# Patient Record
Sex: FEMALE | Race: WHITE
[De-identification: names, ages, dates, MRNs, and addresses within clinical notes are randomized per-mention and may not be internally consistent; named-entity substitution may affect disease eponyms.]

---

## 2017-01-25 NOTE — ER DOCUMENT REPORT
ED GI/





- General


Mode of Arrival: Ambulatory


Information source: Patient


TRAVEL OUTSIDE OF THE [place] IN LAST 30 DAYS: No





- HPI


Patient complains to provider of: Abdominal pain, Diarrhea, Vomiting


Onset: Other - 2 days ago


Location: Other - see above


Associated symptoms: Other - see above





<IONA VAUGHN - Last Filed: 01/25/17 20:32>





<LORRIEDUARDO ANN - Last Filed: 01/26/17 03:20>





- General


Chief Complaint: Abdominal Pain


Stated Complaint: VOMITING/DIARRHEA


Notes: 


32 year old female with history of ADHD, depression, and anxiety presents to 

the ED complaining of right sided abdominal pain that started 2 days ago. 

Patient is additionally complaining of vomiting and diarrhea. Patient states 

that yesterday morning she woke up with a "sour stomach" and took a pepto 

bismol tablet when she arrived to work. Patient explains that she then 

proceeded to vomit and her symptoms have been worsening since then. Patient has 

a past abdominal surgical history of an appendectomy in May 2013. (IONA VAUGHN)





- Related Data


Allergies/Adverse Reactions: 


 





codeine [Codeine] Allergy (Verified 01/25/17 15:38)


 











Past Medical History





- General


Information source: Patient





- Social History


Smoking Status: Current Every Day Smoker


Chew tobacco use (# tins/day): No


Drug Abuse: None


Family History: Reviewed & Not Pertinent


Patient has suicidal ideation: No


Patient has homicidal ideation: No


Renal/ Medical History: Reports: Hx Ectopic Pregnancy - treated with 

methotrexate


Psychiatric Medical History: Reports: Hx Anxiety, Hx Bipolar Disorder, Hx 

Depression


Past Surgical History: Reports: Hx Appendectomy - May 2013





- Immunizations


Hx Diphtheria, Pertussis, Tetanus Vaccination: Yes





<IONA VAUGHN - Last Filed: 01/25/17 20:32>





Review of Systems





- Review of Systems


Constitutional: No symptoms reported


EENT: No symptoms reported


Cardiovascular: No symptoms reported


Respiratory: No symptoms reported


Gastrointestinal: See HPI, Abdominal pain - right side, Diarrhea, Vomiting


Genitourinary: No symptoms reported


Female Genitourinary: No symptoms reported


Musculoskeletal: No symptoms reported


Skin: No symptoms reported


Hematologic/Lymphatic: No symptoms reported


Neurological/Psychological: No symptoms reported


-: Yes All other systems reviewed and negative





<IONA VAUGHN - Last Filed: 01/25/17 20:32>





Physical Exam





- Vital signs


Interpretation: Normal





- General


General appearance: Alert


In distress: None





- HEENT


Head: Normocephalic, Atraumatic


Eyes: Normal


Extraocular movements intact: Yes


Pupils: PERRL


Mucous membranes: Dry





- Respiratory


Respiratory status: No respiratory distress


Breath sounds: Normal





- Cardiovascular


Rhythm: Regular


Heart sounds: Normal auscultation





- Abdominal


Inspection: Normal


Tenderness: Tender - RUQ tenderness to palpation





- Back


Back: Normal





- Extremities


General upper extremity: Normal inspection, Normal ROM


General lower extremity: Normal inspection, Normal ROM





- Neurological


Neuro grossly intact: Yes


Cognition: Normal


Orientation: AAOx4


[place] Coma Scale Eye Opening: Spontaneous


[place] Coma Scale Verbal: Oriented


[place] Coma Scale Motor: Obeys Commands


[place] Coma Scale Total: 15


Speech: Normal





- Psychological


Associated symptoms: Normal affect, Normal mood





- Skin


Skin Temperature: Warm


Skin Moisture: Dry


Skin Color: Normal





<IONA VAUGHN - Last Filed: 01/25/17 20:32>





<EDUARDO BLACKMON - Last Filed: 01/26/17 03:20>





- Vital signs


Vitals: 


 











Temp Pulse Resp BP Pulse Ox


 


 98.4 F   79   16   120/82   96 


 


 01/25/17 15:29  01/25/17 15:29  01/25/17 15:29  01/25/17 15:29  01/25/17 15:29








 (IONA VAUGHN)


 (EDUARDO BLACKMON)





Course





- Laboratory


Result Diagrams: 


 01/25/17 15:45





 01/25/17 15:45





<IONA VAUGHN - Last Filed: 01/25/17 20:32>





- Laboratory


Result Diagrams: 


 01/25/17 15:45





 01/25/17 15:45





- Diagnostic Test


Radiology reviewed: Reports reviewed





<EDUARDO BLACKMON - Last Filed: 01/26/17 03:20>





- Re-evaluation


Re-evalutation: 


01/25/17 21:04


Patient with continued right-sided pain.  Now 18 is lower in her abdomen.  

Patient will be given pain medication and CT ordered.





01/26/17 


Patient feels better after pain medication and fluids.  No acute findings and 

CT.  Blood work within normal limits.  Patient is able to take by mouth.  

Stable for discharge home.  Return if any worsening or concerning symptoms. (

EDUARDO BLACKMON)





- Vital Signs


Vital signs: 


 











Temp Pulse Resp BP Pulse Ox


 


 98.4 F   79   16   120/82   96 


 


 01/25/17 15:29  01/25/17 15:29  01/25/17 15:29  01/25/17 15:29  01/25/17 15:29








 (IONA VAUGHN)


 (EDUARDO BLACKMON)





- Laboratory


Laboratory results interpreted by me: 


 











  01/25/17 01/25/17





  15:45 15:50


 


WBC  14.1 H 


 


Absolute Neutrophils  10.4 H 


 


Urine Protein   100 H


 


Ur Leukocyte Esterase   MODERATE H








 (IONA VAUGHN)


 (EDUARDO BLACKMON)





Discharge





<IONA VAUGHN - Last Filed: 01/25/17 20:32>





<EDUARDO BLACKMON - Last Filed: 01/26/17 03:20>





- Discharge


Clinical Impression: 


Abdominal pain


Qualifiers:


 Abdominal location: right upper quadrant Qualified Code(s): R10.11 - Right 

upper quadrant pain





Vomiting


Qualifiers:


 Vomiting type: unspecified Vomiting Intractability: non-intractable Nausea 

presence: with nausea Qualified Code(s): R11.2 - Nausea with vomiting, 

unspecified





Diarrhea


Qualifiers:


 Diarrhea type: unspecified type Qualified Code(s): R19.7 - Diarrhea, 

unspecified





Condition: Stable


Disposition: HOME, SELF-CARE


Instructions:  Abdominal Pain ([facility]), Vomiting ([facility]), Diarrhea, Nonspecific ([facility])


Prescriptions: 


Ondansetron [Zofran Odt 4 mg Tablet] 1 - 2 tab PO Q4H PRN #15 tab.rapdis


 PRN Reason: For Nausea/Vomiting


Oxycodone HCl/Acetaminophen [Percocet 5-325 mg Tablet] 1 - 2 tab PO Q4H PRN #15 

tablet


 PRN Reason: 


Forms:  Return to Work


Referrals: 


BRUNO LOVE MD [Primary Care Provider] - Follow up as needed


Scribe Attestation: 





01/26/17 03:19


I personally performed the services described in the documentation, reviewed 

and edited the documentation which was dictated to the scribe in my presence, 

and it accurately records my words and actions. (EDUARDO BLACKMON)





Scribe Documentation





- Scribe


Written by Scribe:: Ralph Ha, 01/25/2017 19:14


acting as scribe for :: Lorri





<IONA VAUGHN - Last Filed: 01/25/17 20:32>

## 2017-01-25 NOTE — ER DOCUMENT REPORT
ED Medical Screen (RME)





- General


Stated Complaint: VOMITING/DIARRHEA


Notes: 


Patient is a 32-year-old female presents emergency Department with vomiting and 

diarrhea for the past 2 days.  She does admit to hematemesis that started 

today.  Admits to on and off hot flashes at home.  Admits to abdominal pain 

over her umbilicus that radiates up to her chest.  Appendectomy May 2013.





I have greeted and performed a rapid initial assessment of this patient. A 

comprehensive ED assessment and evaluation of the patient, analysis of test 

results and completion of the medical decision making process will be conducted 

by additional ED providers.








TRAVEL OUTSIDE OF THE [place] IN LAST 30 DAYS: No





- Related Data


Allergies/Adverse Reactions: 


 





codeine [Codeine] Allergy (Verified 11/23/16 13:02)


 











Past Medical History


Pulmonary Medical History: 


   Denies: Hx Asthma, Hx Bronchitis, Hx Pneumonia


Renal/ Medical History: Reports: Hx Ectopic Pregnancy - treated with 

methotrexate


Psychiatric Medical History: Reports: Hx Anxiety, Hx Bipolar Disorder, Hx 

Depression


Past Surgical History: Reports: Hx Appendectomy





- Immunizations


Hx Diphtheria, Pertussis, Tetanus Vaccination: Yes





Physical Exam





- Vital signs


Vitals: 





 











Temp Pulse Resp BP Pulse Ox


 


 98.4 F   79   16   120/82   96 


 


 01/25/17 15:29  01/25/17 15:29  01/25/17 15:29  01/25/17 15:29  01/25/17 15:29














Course





- Vital Signs


Vital signs: 





 











Temp Pulse Resp BP Pulse Ox


 


 98.4 F   79   16   120/82   96 


 


 01/25/17 15:29  01/25/17 15:29  01/25/17 15:29  01/25/17 15:29  01/25/17 15:29

## 2017-03-06 NOTE — ER DOCUMENT REPORT
HPI





- HPI


Patient complains to provider of: upper respiratory symptoms


Onset: Other - 4 days


Onset/Duration: Gradual


Quality of pain: Achy


Pain Level: 4


Context: 


Patient complains of sore throat, cough and congestion for the past 4 days.  

Patient reports productive cough.  Patient states she has had some nausea.  

Patient denies any vomiting or diarrhea.  Patient reports fever of 102 

yesterday.  Reports multiple sick contacts at work with similar symptoms.


Associated Symptoms: Body/muscle aches, Chills, Productive cough, Fever, Sore 

throat.  denies: Headache


Exacerbated by: Denies


Relieved by: Denies


Similar symptoms previously: Yes


Recently seen / treated by doctor: No





- ROS


ROS below otherwise negative: Yes


Systems Reviewed and Negative: Yes All other systems reviewed and negative





- CONSTITUTIONAL


Constitutional: REPORTS: Fever, Chills





- EENT


EENT: REPORTS: Sore Throat, Congestion





- RESPIRATORY


Respiratory: REPORTS: Coughing.  DENIES: Trouble Breathing





- GASTROINTESTINAL


Gastrointestinal: REPORTS: Nausea.  DENIES: Patient vomiting, Diarrhea, Black / 

Bloody Stools





- REPRODUCTIVE


Reproductive: DENIES: Pregnant:





- MUSCULOSKELETAL


Musculoskeletal: DENIES: Extremity pain





- DERM


Skin Color: Normal


Skin Problems: None





Past Medical History





- General


Information source: Patient


Last Menstrual Period: 02/16/2017





- Social History


Smoking Status: Current Every Day Smoker - One pack per day


Frequency of alcohol use: None


Drug Abuse: None


Occupation: [employer]


Lives with: Family


Family History: Reviewed & Not Pertinent


Pulmonary Medical History: 


   Denies: Hx Asthma, Hx Bronchitis, Hx Pneumonia


Renal/ Medical History: Reports: Hx Ectopic Pregnancy - treated with 

methotrexate.  Denies: Hx Peritoneal Dialysis


Musculoskeltal Medical History: Reports Hx Arthritis - Sciatica


Psychiatric Medical History: Reports: Hx Anxiety, Hx Bipolar Disorder, Hx 

Depression


Past Surgical History: Reports: Hx Appendectomy - May 2013





- Immunizations


Hx Diphtheria, Pertussis, Tetanus Vaccination: Yes





Vertical Provider Document





- CONSTITUTIONAL


Agree With Documented VS: Yes


Exam Limitations: No Limitations


General Appearance: WD/WN, No Apparent Distress





- INFECTION CONTROL


TRAVEL OUTSIDE OF THE [place] IN LAST 30 DAYS: No





- HEENT


HEENT: Atraumatic, Normocephalic, Pharyngeal Tenderness.  negative: Pharyngeal 

Exudate, Tympanic Membrane Red, Tympanic Membrane Bulging





- NECK


Neck: Normal Inspection, Supple.  negative: Lymphadenopathy-Left, 

Lymphadenopathy-Right





- RESPIRATORY


Respiratory: No Respiratory Distress, Rhonchi, Wheezing


O2 Sat by Pulse Oximetry: 98





- CARDIOVASCULAR


Cardiovascular: Regular Rhythm, No Murmur, Tachycardia





- BACK


Back: Normal Inspection





- MUSCULOSKELETAL/EXTREMETIES


Musculoskeletal/Extremeties: MAEW, FROM





- NEURO


Level of Consciousness: Awake, Alert, Appropriate


Motor/Sensory: No Motor Deficit





- DERM


Integumentary: Warm, Dry, No Rash





Course





- Re-evaluation


Re-evalutation: 


03/06/17 17:52


Patient with decreased wheezing and increased air movement bilaterally.





03/06/17 19:20


Heart rate continues mildly tachycardic 114.  Patient states that she feels 

much better after the breathing treatment.  Patient does take Adderall and 

states that she has had an elevated heart rate in the past but on subsequent 

visits her heart rate has normalized.  Patient denies any history of DVT or PE, 

recent travel, bedrest or immobilization.  Patient does not take any birth 

control. 





Consulted with Dr. Rivero regarding patient presentation.  Agrees with 

discharge plan of care, does not recommend any additional testing at this time.

  





- Vital Signs


Vital signs: 


 











Temp Pulse Resp BP Pulse Ox


 


 98.1 F   111 H  18   141/82 H  98 


 


 03/06/17 14:27  03/06/17 14:27  03/06/17 14:27  03/06/17 14:27  03/06/17 14:27














- Diagnostic Test


Radiology reviewed: Image reviewed, Reports reviewed





Discharge





- Discharge


Clinical Impression: 


 Bronchospasm





Upper respiratory infection


Qualifiers:


 URI type: unspecified URI Qualified Code(s): J06.9 - Acute upper respiratory 

infection, unspecified





Condition: Stable


Disposition: HOME, SELF-CARE


Additional Instructions: 


Return immediately for any new or worsening symptoms





Followup with your primary care provider, call tomorrow to make a followup 

appointment





UPPER RESPIRATORY ILLNESS:





     You have a viral infection of the respiratory passages -- a "cold."  This 

common infection causes nasal congestion, drainage, and often sore throat and 

cough.  It is highly contagious.  The disease usually lasts about 10 to 14 days.


     There is no "cure" for the viral infection -- it must run its course.  If 

there is a complication, such as bacterial infection in the nose, sinuses, 

middle ear, or bronchial tubes, antibiotics may be required.  The antibiotics 

won't affect the virus.


     Drink plenty of fluids.  A humidifier may help.  An expectorant medication 

or decongestant may make you more comfortable.  Use acetaminophen or ibuprofen 

for fever or aches.


     See the doctor if fever persists over two days, if there is any 

significant worsening of your symptoms, or if you simply fail to improve as 

expected.








BRONCHOSPASM:





     You have tightness in the bronchial tubes, called bronchospasm. This often 

occurs with bronchial infections.  Allergies, inhaled chemicals, and polluted 

or cold air can also provoke bronchospasm. It's more likely in patients with 

asthma in the family.


     Emergency treatment of bronchospasm may include adrenaline shots or 

bronchodilator aerosol.  You may feel lightheaded and have a rapid pulse for an 

hour or two.  Rest and get plenty of fluids.


     At home, we'll treat you with a bronchodilator inhaler. Antibiotics and 

corticosteroids may be required for some patients. Until you recover, avoid 

chemical fumes, dusts, pollens, and exercising in very cold or dry air. If you 

smoke, stop now!!


     If you develop a fever, increased wheezing, chest pain, or severe 

shortness of breath, you should contact the doctor immediately.





INHALED BRONCHODILATORS:


     You have received a treatment of and/or prescription for an inhaled 

bronchodilator -- a medication which stimulates the airways in the lung to 

dilate.  This improves the flow of air in asthma, bronchitis, and emphysema.


     These medicines have some similarity to adrenaline, and can cause similar 

side effects:  shakiness, racing heart, and a sense of nervousness.  These side 

effects decrease with time.  Contact your doctor if these side effects are 

severe.


     Do not over-use the medicine.  Too-frequent use of the inhaler may make it 

ineffective.  Call your doctor if the inhaler is not controlling your symptoms 

at the prescribed doses.








STEROID MEDICATION:


     You have been given an injection of or oral medicine of the cortisone/

steroid class.  This medication is used to control inflammation or allergy.  

Barak t is usually only given for a short period of time, until the acute process 

subsides.


     There are usually no side effects from short-term use of cortisone-like 

medications.  Some persons feel an increased sense of well-being and are not 

sleepy at bedtime.  Long-term use of cortisone medications is best avoided, 

unless required for a severe condition.  If your condition does not remit, or 

relapses after the course of corticosteroid medication, you should consult your 

physician.








USE OF ACETAMINOPHEN (Tylenol):


     Acetaminophen may be taken for pain relief or fever control. It's much 

safer than aspirin, offering a wider range of "safe" dosages.  It is safe 

during pregnancy.  Some brand names are Tylenol, Panadol, Datril, Anacin 3, 

Tempra, and Liquiprin. Acetaminophen can be repeated every four hours.  The 

following are maximum recommended dosages:


>89 pounds or adults          650 mg to 900 mg


Acetaminophen can be repeated every four hours.  Maximum dose not to exceed 

4000 mg a day.








SMOKING:


     If you smoke, you should stop smoking.  The tar and chemicals in cigarette 

smoke are harmful.  Smoking has been shown to cause:


          emphysema


          chronic bronchitis


          lung cancer


          mouth and throat cancer


          stomach and pancreas cancer


          premature aging


          birth defects


     In addition, smoking increases ear and lung infections in children of 

smokers.








FOLLOW-UP CARE:


If you have been referred to a physician for follow-up care, call the physician

s office for an appointment as you were instructed or within the next two days.

  If you experience worsening or a significant change in your symptoms, notify 

the physician immediately or return to the Emergency Department at any time for 

re-evaluation.





Prescriptions: 


Albuterol Sulfate [Ventolin Hfa] 2 puff IH Q4HP PRN #17 gm


 PRN Reason: 


Oxycodone HCl/Acetaminophen [Percocet 5-325 mg Tablet] 1 tab PO ASDIR PRN #12 

tablet


 PRN Reason: 


Prednisone [Deltasone 20 mg Tablet] 3 tab PO DAILY 4 Days


Forms:  Smoking Cessation Education, Return to Work


Referrals: 


BRUNO LOVE MD [Primary Care Provider] - Follow up tomorrow

## 2017-04-13 NOTE — EKG REPORT
SEVERITY:- ABNORMAL ECG -

SINUS RHYTHM

INCOMPLETE RIGHT BUNDLE BRANCH BLOCK

:

Confirmed by: Seth Moise MD 13-Apr-2017 19:49:46

## 2017-04-13 NOTE — ER DOCUMENT REPORT
ED General





- General


Chief Complaint: Psych Problem


Stated Complaint: IVC W/PAPERS


TRAVEL OUTSIDE OF THE [place] IN LAST 30 DAYS: No





- HPI


Patient complains to provider of: homicidal suicidal ideation


Notes: 


Patient coming in on IVC paper work for her suicidal ideation.  Patient admits 

to homicidal suicidal ideation.  Patient states that she would had a plan to 

kill her  and kill herself as that she thought her  could not 

live without her.  Patient does states she's had multiple psychiatric 

admissions in the past.  Patient does state she has been compliant with her 

medications.  Upon my evaluation patient is sitting on the bed comfortably in 

no obvious distress





- Related Data


Allergies/Adverse Reactions: 


 





codeine [Codeine] Allergy (Verified 01/25/17 15:38)


 








Home Medications: 


 Current Home Medications





Clonazepam [Klonopin 1 mg Tablet] 1 mg PO BID 04/13/17 [History]


Cyclobenzaprine HCl [Cyclobenzaprine HCl] 1 tab PO BID PRN 04/13/17 [History]


Dextroamphetamine/Amphetamine [Adderall XR 20 mg Capsule] 1 cap.sr PO DAILY 04/ 13/17 [History]


Venlafaxine HCl ER [Effexor Xr 75 mg Cap.sr] 225 mg PO DAILY 04/13/17 [History]


Zaleplon [Sonata] 10 mg PO QHS 04/13/17 [History]











Past Medical History





- Social History


Smoking Status: Current Every Day Smoker


Frequency of alcohol use: None


Drug Abuse: None


Family History: Reviewed & Not Pertinent


Patient has suicidal ideation: Yes


Patient has homicidal ideation: Yes


Pulmonary Medical History: 


   Denies: Hx Asthma, Hx Bronchitis, Hx Pneumonia


Renal/ Medical History: Reports: Hx Ectopic Pregnancy - treated with 

methotrexate.  Denies: Hx Peritoneal Dialysis


Musculoskeltal Medical History: Reports Hx Arthritis - Sciatica


Psychiatric Medical History: Reports: Hx Anxiety, Hx Bipolar Disorder, Hx 

Depression


Past Surgical History: Reports: Hx Appendectomy - May 2013





- Immunizations


Hx Diphtheria, Pertussis, Tetanus Vaccination: Yes





Review of Systems





- Review of Systems


Constitutional: No symptoms reported


EENT: No symptoms reported


Cardiovascular: No symptoms reported


Respiratory: No symptoms reported


Gastrointestinal: No symptoms reported


Genitourinary: No symptoms reported


Female Genitourinary: No symptoms reported


Musculoskeletal: No symptoms reported


Skin: No symptoms reported


Hematologic/Lymphatic: No symptoms reported


Neurological/Psychological: Other - Homicidal suicidal ideation


-: Yes All other systems reviewed and negative





Physical Exam





- Vital signs


Vitals: 





 











Temp Pulse Resp BP Pulse Ox


 


 98.4 F   115 H  20   134/77 H  97 


 


 04/13/17 17:31  04/13/17 17:31  04/13/17 17:31  04/13/17 17:31  04/13/17 17:31











Interpretation: Normal





- General


General appearance: Appears well, Alert





- HEENT


Head: Normocephalic, Atraumatic


Eyes: Normal


Pupils: PERRL





- Respiratory


Respiratory status: No respiratory distress


Chest status: Nontender


Breath sounds: Normal


Chest palpation: Normal





- Cardiovascular


Rhythm: Regular


Heart sounds: Normal auscultation


Murmur: No





- Abdominal


Inspection: Normal


Distension: No distension


Bowel sounds: Normal


Tenderness: Nontender


Organomegaly: No organomegaly





- Back


Back: Normal, Nontender





- Extremities


General upper extremity: Normal inspection, Nontender, Normal color, Normal ROM

, Normal temperature


General lower extremity: Normal inspection, Nontender, Normal color, Normal ROM

, Normal temperature, Normal weight bearing.  No: Abram's sign





- Neurological


Neuro grossly intact: Yes


Cognition: Normal


Orientation: AAOx4


[place] Coma Scale Eye Opening: Spontaneous


[place] Coma Scale Verbal: Oriented


[place] Coma Scale Motor: Obeys Commands


[place] Coma Scale Total: 15


Speech: Normal


Motor strength normal: LUE, RUE, LLE, RLE


Sensory: Normal





- Psychological


Associated symptoms: Depressed, Flat affect





- Skin


Skin Temperature: Warm


Skin Moisture: Dry


Skin Color: Normal





Course





- Re-evaluation


Re-evalutation: 





04/13/17 22:09


Patient's lab work does show signs of dehydration.  Patient was encouraged to 

continue to drink fluids.  Patient will continue on IVC paper work patient 

medically cleared for further evaluation





- Vital Signs


Vital signs: 





 











Temp Pulse Resp BP Pulse Ox


 


 98.4 F   115 H  20   134/77 H  97 


 


 04/13/17 17:31  04/13/17 17:31  04/13/17 17:31  04/13/17 17:31  04/13/17 17:31











04/13/17 22:09








- Laboratory


Result Diagrams: 


 04/13/17 19:00





 04/13/17 19:00


Laboratory results interpreted by me: 





 











  04/13/17 04/13/17 04/13/17





  19:00 19:00 19:45


 


WBC  11.3 H  


 


Absolute Neutrophils  8.3 H  


 


Glucose   201 H 


 


Urine Glucose (UA)    50 H


 


Salicylates   < 1.0 L 


 


Acetaminophen   < 10 L 














Discharge





- Discharge


Clinical Impression: 


 Homicidal ideation, Suicidal ideation





Condition: Good


Disposition: PSYCH HOSP/UNIT

## 2017-04-15 NOTE — ER DOCUMENT REPORT
Doctor's Note


Notes: 





04/15/17 10:28


Rounds: Chart reviewed and patient interview.  Patient denies having homicidal 

or suicidal thoughts at this time.  Says she's feeling better and is ready to 

go home.  All vital signs within normal.  Labs were normal with the exception 

of her drug screen being positive for amphetamines and marijuana.  Patient 

appears medically stable for transfer or discharge.


ALYSSA Maldonado M.D.

## 2017-04-15 NOTE — PSYCHOLOGICAL NOTE
Psych Note





- Psych Note


Psych Note: 


Patient coming in on IVC paper work for suicidal ideation.  Patient admits to 

homicidal suicidal ideation.  Patient states that she would had a plan to kill 

her  and kill herself as that she thought her  could not live 

without her.  Patient does states she's had multiple psychiatric admissions in 

the past.  Patient does state she has been compliant with her medications.  

Upon my evaluation patient is sitting on the bed comfortably in no obvious 

distress





Patient disclosed she is feeling much better.  Clinician notes patient is 

sitting up in opening engaging with clinician.  Patient is noted to be smiling.

  Patient disclosed last night approximately 20 minutes after getting 

medication she started to feel "less anxiety."  Patient states she is no longer 

thinking of suicide and absolutely wouldn't think of killing her .  

Patient reiterated that she knew she needed help when that thought came into 

her head.  Patient provided clinician name and number to patient's  

disclosing that she has always told him everything however she had not gotten 

to tell him about the homicidal ideation.  Patient provided consent to 

clinician to discuss this with patient's .





Clinician spoke with patient's , Orlando [phone], he disclosed that he 

knew his wife needed to come in because she was pulling away.  He continued 

disclosed unwavering support to patient.  Clinician discussed with Orlando 

homicidal ideation and patient's plan.  I am disclosed shock however states 

thinking on the past days he he thinks he can pinpoint when spell started 

entering her mind.  Clinician discussed triggers, Orlando disclosed patient's 

sister just had a baby and they have decided not to have children.  Clinician 

discussed with Orlando that patient disclosed her inability to have children; he 

confirm this, stating that has been very hard.  He continued disclosed that he 

will ensure the patient has no access to any medications that are prescribed or 

over-the-counter, weapons, or any substances that can be easily used as poison 

i.e. Borax, mouse or rat poisoning, fertilizer.  





Patient is alert and orientated to person, place, time and circumstance.  Mood 

is euthymic with congruent affect.  Patient denies current suicidal and 

homicidal ideation.  Patient denies auditory and visual hallucinations; patient 

is not demonstrating behaviour what would be congruent to responding to 

internal stimuli.  No delusions are noted.  Thought process is organized and 

linear.  Eye contact was well maintained.  Intellectual abilities appear to be 

within average range.  Attention and concentration is good.  Insight, judgment, 

and impulse control is fair. 





296.89 (F31.81) Bipolar II Disorder


As evidenced by existing disclosed mental health diagnosis, in additional 

symptoms provided by patient i.e. periods of extreme depression  by 

times of extreme irritability, difficult sleeping, difficulty concentrating and 

staying on task.  There are no noted periods of manic behaviour only hypomanic 

and depressive. 





Impression/plan:  Patient is recommended for rescind of IVC and are considered 

psychiatrically clear for discharge. They do not meet IVC criteria per [place] GS 

122C. Patient denies current suicidal and homicidal ideation.  Patient reports 

positive results from medication changes recommended by behavioral health team.

  Clinician notes presentation of patient to be significantly improved and can 

be followed up with out patient services.  Patient is recommend to follow up 

with home mental health provider [facility], and is asked to call within 3 days to 

make an appointment.  Patient's  agrees to ensure the patient does not 

have access to medications, weapons or common household poisons.  That patient'

s  is urged to return with the patient if suicidal or homicidal ideation 

returns. Dr. Martinez was consulted on the care and management of this patient; 

attending physician is in agreement with recommendations and disposition.

## 2017-04-15 NOTE — PSYCHOLOGICAL NOTE
Psych Note





- Psych Note


Psych Note: 


Patient coming in on IVC paper work for suicidal ideation.  Patient admits to 

homicidal suicidal ideation.  Patient states that she would had a plan to kill 

her  and kill herself as that she thought her  could not live 

without her.  Patient does states she's had multiple psychiatric admissions in 

the past.  Patient does state she has been compliant with her medications.  

Upon my evaluation patient is sitting on the bed comfortably in no obvious 

distress





Patient endorses suicidal ideation and continued to disclose the it has become 

so bad that she has started to think about her  finding her body.  She 

states that she "couldn't leave him;" he is so "dependent on her" that she 

started to think about killing him also.  She states that she thought about 

using poison on both her  and on herself. Clinician notes that patient 

became very distraught at this point with crying and rocking on the bed. The 

patient states that her  and her are best friends and while they both 

have family in the area, they spend most of their time together. Patient states 

that when she thought of that, it scared her and she knew she needed help. She 

disclosed that she thinks her trigger was when her sister came to visit;her 

sister just had a baby.  The patient states that in May of 2013 she found out 

that she was unable to have children.  This has been a very difficult for her 

to cope with, she confirms she has not gone to therapy. Patient attends [facility] 

for medication management and states she has diagnosis of Major Depression, ADHA

, and General Anxiety.  





Patient is alert and orientated to person, place, time and circumstance.  Mood 

is dysphoric with tearful affect.  Patient endorses suicidal and homicidal 

ideation.  Patient denies auditory and visual hallucinations; patient is not 

demonstrating behaviour what would be congruent to responding to internal 

stimuli.  No delusions are noted.  Thought process is organized and linear.  

Eye contact was well maintained.  Intellectual abilities appear to be within 

average range.  Attention and concentration is good.  Insight, judgment, and 

impulse control is fair. 





296.89 (F31.81) Bipolar II Disorder


As evidenced by existing disclosed mental health diagnosis, in additional 

symptoms provided by patient i.e. periods of extreme depression  by 

times of extreme irritability, difficult sleeping, difficulty concentrating and 

staying on task.  There are no noted periods of manic behaviour only hypomanic 

and depressive. 





Impression/plan: Patient is recommend to continue under IVC; patient is 

currently demonstrating dysphoric mood with tearful affect while endorsing 

suicidal and homicidal ideation.  Patient is currently danger to self and 

others.  Patient disclosed medication previously prescribed as Effexor, Adderall

, Klonopin, and a sleep aid.  Behavior health team recommends medication 

changes to discontinuing previous medications and replacing them with Depakote, 

Zyprexa and Cogentin.  Patient will be reevaluated.  Dr. Martinez was consulted 

on the Management of this patient; attending physician is in agreement with 

recommendations and disposition.

## 2017-06-12 NOTE — RADIOLOGY REPORT (SQ)
EXAM DESCRIPTION:  U/S ABDOMEN LIMITED W/O DOP



COMPLETED DATE/TIME:  6/12/2017 1:46 am



REASON FOR STUDY:  RUQ and epigastric pain



COMPARISON:  None.



TECHNIQUE:  Dynamic and static grayscale images acquired of the abdomen and recorded on PACS. Additio
nal selected color Doppler and spectral images recorded.



LIMITATIONS:  Body habitus and bowel gas.



FINDINGS:  PANCREAS: Obscured.

LIVER: No masses.  Moderate hepatic steatosis.

LIVER VASCULATURE: Normal directional flow of the main portal vein and hepatic veins.

GALLBLADDER: No stones. Normal wall thickness. No pericholecystic fluid.

ULTRASOUND-DETECTED [place]'S SIGN: Negative.

INTRAHEPATIC DUCTS AND COMMON DUCT: 0.5 cm diameter CBD and intrahepatic ducts normal caliber. No annel
ling defects.

INFERIOR VENA CAVA: Normal flow.

AORTA: No aneurysm.

RIGHT KIDNEY:  Normal size. Normal echogenicity. No solid or suspicious masses. No hydronephrosis. No
 calcifications.

PERITONEAL AND RIGHT PLEURAL SPACE: No ascites or effusions.

OTHER: No other significant findings.



IMPRESSION:  No acute findings.  Moderate hepatic steatosis.  Obscured pancreas.



TECHNICAL DOCUMENTATION:  JOB ID:  [id number]

 2011 [email or web address]- All Rights Reserved

## 2017-06-12 NOTE — ER DOCUMENT REPORT
ED General





- General


Chief Complaint: Abdominal Pain


Stated Complaint: LEFT HAND NUMBNESS,ABDOMINAL PAIN


Time Seen by Provider: 06/12/17 00:29


Notes: 


Patient is a 32 year old female that comes to the ED for chief complaint of 

abdominal pain, urinary urgency, and numbness in the 1st, 2nd, and 3rd digit of 

the right hand. She also states that after she ate a grilled cheese sandwich 

she vomited today and there were a few flecks of blood in it. She states she 

had a bowel movement earlier, denies black or bloody stools. She denies fever. 

She is currently on her menstrual cycle. She has had an appendectomy, hx 

depression and medicated for this. 





TRAVEL OUTSIDE OF THE [place] IN LAST 30 DAYS: No





- Related Data


Allergies/Adverse Reactions: 


 





codeine [Codeine] Allergy (Verified 06/12/17 02:08)


 











Past Medical History





- General


Information source: Patient





- Social History


Smoking Status: Never Smoker


Frequency of alcohol use: None


Drug Abuse: None


Lives with: Family


Family History: Reviewed & Not Pertinent


Patient has suicidal ideation: No


Patient has homicidal ideation: No


Pulmonary Medical History: 


   Denies: Hx Asthma, Hx Bronchitis, Hx Pneumonia


Renal/ Medical History: Reports: Hx Ectopic Pregnancy - treated with 

methotrexate.  Denies: Hx Peritoneal Dialysis


Musculoskeltal Medical History: Reports Hx Arthritis - Sciatica


Psychiatric Medical History: Reports: Hx Anxiety, Hx Bipolar Disorder, Hx 

Depression


Past Surgical History: Reports: Hx Appendectomy - May 2013





- Immunizations


Hx Diphtheria, Pertussis, Tetanus Vaccination: Yes





Review of Systems





- Review of Systems


Constitutional: See HPI


EENT: No symptoms reported


Cardiovascular: No symptoms reported


Respiratory: No symptoms reported


Gastrointestinal: See HPI


Genitourinary: See HPI


Female Genitourinary: No symptoms reported


Musculoskeletal: No symptoms reported


Skin: No symptoms reported


Hematologic/Lymphatic: No symptoms reported


Neurological/Psychological: No symptoms reported





Physical Exam





- Vital signs


Vitals: 


 











Temp Pulse Resp BP Pulse Ox


 


 98.1 F   111 H  16   141/90 H  97 


 


 06/11/17 23:23  06/11/17 23:23  06/11/17 23:23  06/11/17 23:23  06/11/17 23:23











Interpretation: Normal





- General


General appearance: Appears well, Alert


In distress: None





- HEENT


Head: Normocephalic, Atraumatic


Eyes: Normal


Pupils: PERRL


Sinus: Normal


Nasal: Normal


Mouth/Lips: Normal


Mucous membranes: Dry - Very dry tongue and lips


Pharynx: Normal


Neck: Normal





- Respiratory


Respiratory status: No respiratory distress


Chest status: Nontender


Breath sounds: Normal


Chest palpation: Normal





- Cardiovascular


Rhythm: Regular


Heart sounds: Normal auscultation


Murmur: No





- Abdominal


Inspection: Normal


Distension: No distension


Bowel sounds: Normal


Tenderness: Tender - Tender in the general upper abdomen including the right 

upper quadrant, no guarding, no lower abdominal tenderness


Organomegaly: No organomegaly





- Back


Back: Normal, Nontender





- Extremities


General upper extremity: Normal inspection, Nontender, Normal color, Normal ROM

, Normal temperature


General lower extremity: Normal inspection, Nontender, Normal color, Normal ROM

, Normal temperature, Normal weight bearing.  No: Abram's sign





- Neurological


Neuro grossly intact: Yes


Cognition: Normal


Orientation: AAOx4


[place] Coma Scale Eye Opening: Spontaneous


[place] Coma Scale Verbal: Oriented


[place] Coma Scale Motor: Obeys Commands


[place] Coma Scale Total: 15


Speech: Normal


Motor strength normal: LUE, RUE, LLE, RLE


Sensory: Normal





- Psychological


Associated symptoms: Normal affect, Normal mood





- Skin


Skin Temperature: Warm


Skin Moisture: Dry


Skin Color: Normal





Course





- Re-evaluation


Re-evalutation: 


Patient with parched mucous membranes on examination, very elevated specific 

gravity, hyperglycemia with no anion gap abnormality, bicarbonate is 21.  

Patient hydrated, given insulin, glucose down trended, symptoms improved 

significantly.  Patient has some upper abdominal and right upper quadrant pain 

on exam, ultrasound however shows no pathology except for fatty infiltration of 

the liver.  Chemistry does not suggest obstructive abnormality.  Discussed with 

patient in detail, patient states that she "did this to herself", however she 

states she is motivated to treat this and get better, she states that she will 

see Dr. Willson this week for a close follow-up, discussed return precautions, 

patient states understanding and agreement.





- Vital Signs


Vital signs: 


 











Temp Pulse Resp BP Pulse Ox


 


 97.5 F   90   16   111/78   95 


 


 06/12/17 03:31  06/12/17 03:31  06/12/17 03:31  06/12/17 03:31  06/12/17 03:31














- Laboratory


Result Diagrams: 


 06/12/17 00:03





 06/12/17 00:03


Laboratory results interpreted by me: 


 











  06/11/17 06/12/17 06/12/17





  23:27 00:03 00:03


 


WBC   13.8 H 


 


Absolute Neutrophils   9.1 H 


 


Sodium    133.8 L


 


Chloride    97 L


 


Carbon Dioxide    21 L


 


Creatinine    0.46 L


 


Glucose    461 H*


 


POC Glucose  488 H*  


 


AST    63 H


 


ALT    81 H


 


Alkaline Phosphatase    175 H


 


Urine Glucose (UA)   


 


Urine Blood   














  06/12/17 06/12/17 06/12/17





  00:03 02:13 03:26


 


WBC   


 


Absolute Neutrophils   


 


Sodium   


 


Chloride   


 


Carbon Dioxide   


 


Creatinine   


 


Glucose   


 


POC Glucose   292 H  218 H


 


AST   


 


ALT   


 


Alkaline Phosphatase   


 


Urine Glucose (UA)  >=500 H  


 


Urine Blood  LARGE H  














Discharge





- Discharge


Clinical Impression: 


 Hyperglycemia, Paresthesias, Dehydration





Abdominal pain


Qualifiers:


 Abdominal location: upper abdomen, unspecified Qualified Code(s): R10.10 - 

Upper abdominal pain, unspecified





Condition: Stable


Disposition: HOME, SELF-CARE


Additional Instructions: 


Your workup and evaluation is consistent with type II diabetes, dehydration, 

and some fatty infiltration of the liver. 


Take the metformin as prescribed, please follow up within the week with your 

primary care provider for additional management. Avoid carbohydrates in your 

diet.


Return to the ED for any concerning or worsening symptoms.





Prescriptions: 


Metformin HCl [Glucophage] 500 mg PO BID #30 tablet

## 2017-10-16 NOTE — RADIOLOGY REPORT (SQ)
EXAM DESCRIPTION:  CT LTD RENAL STONE PROTOCOL ON



COMPLETED DATE/TIME:  10/16/2017 2:31 am



REASON FOR STUDY:  flank pain, hematuria, UTI



COMPARISON:  1/25/2017



TECHNIQUE:  CT scan of the abdomen and pelvis performed without intravenous or oral contrast. Images 
reviewed with lung, soft tissue, and bone windows. Reconstructed coronal and sagittal MPR images revi
ewed. All images stored on PACS.

All CT scanners at this facility use dose modulation, iterative reconstruction, and/or weight based d
osing when appropriate to reduce radiation dose to as low as reasonably achievable (ALARA).

[facility]: Dose Right  [facility]: CareDose    [facility]: Dose Right    [facility]: Teradose 4D    [facility]: Smart Kimble



RADIATION DOSE:  Up-to-date CT equipment and radiation dose reduction techniques were employed. CTDIv
ol: 19.2 mGy. DLP: 1104 mGy-cm.mGy.



LIMITATIONS:  None.



FINDINGS:  LOWER CHEST: No significant findings. No nodules or infiltrates.

NON-CONTRASTED LIVER, SPLEEN, ADRENALS: Fatty liver.  Spleen and adrenal glands normal.

PANCREAS: No masses. No peripancreatic inflammatory changes.

GALLBLADDER: No identified stones by CT criteria. No inflammatory changes to suggest cholecystitis.

RIGHT KIDNEY AND URETER: No suspicious masses. Assessment limited by lack of IV contrast.   No signif
icant calcifications.   No hydronephrosis or hydroureter.

LEFT KIDNEY AND URETER: No suspicious masses. Assessment limited by lack of IV contrast.   No signifi
cant calcifications.   No hydronephrosis or hydroureter.

AORTA AND RETROPERITONEUM: No aneurysm. No retroperitoneal masses or adenopathy.

BOWEL AND PERITONEAL CAVITY: No obvious masses or inflammatory changes. No free fluid.

APPENDIX: Not visualized.

PELVIS, BLADDER, AND ABDOMINAL WALL:No abnormal masses. No free fluid. Bladder normal.

BONES: No significant findings.

OTHER: No other significant finding.



IMPRESSION:  NO SIGNIFICANT OR ACUTE PROCESS IN THE ABDOMEN OR PELVIS.



COMMENT:  Quality ID # [id number]: Final reports with documentation of one or more dose reduction techniques
 (e.g., Automated exposure control, adjustment of the mA and/or kV according to patient size, use of 
iterative reconstruction technique)



TECHNICAL DOCUMENTATION:  JOB ID:  [id number]

 [employer]- All Rights Reserved

## 2017-10-16 NOTE — ER DOCUMENT REPORT
ED GI/





- General


Mode of Arrival: Ambulatory


Information source: Patient


TRAVEL OUTSIDE OF THE [place] IN LAST 30 DAYS: No





- HPI


Patient complains to provider of: Dysuria, Flank pain - right, Hematuria


Onset: Other - 1 week ago


Location: RLQ, Right flank


Associated symptoms: Other - see notes above





<IONA VAUGHN - Last Filed: 10/16/17 01:44>





<THERESE BENAVIDES - Last Filed: 10/16/17 04:21>





- General


Chief Complaint: Urinary Problem


Stated Complaint: POSSIBLE BLADDER INFECTION


Time Seen by Provider: 10/16/17 01:25


Notes: 





32 year old female with history of type II diabetes mellitus and back pain 

presents to the ED complaining of right flank pain that started 1 week ago. 

Patient initially attributed her flank pain as her normal back pain, but then 

began experiencing a 'bladder infection' 2 days ago. Patient is complaining of 

RLQ abdominal pain, dysuria, vomiting, diarrhea, and hematuria. Patient denies 

fever, chest pain, or shortness of breath. (IONA VAUGHN)





- Related Data


Allergies/Adverse Reactions: 


 





codeine [Codeine] Allergy (Verified 10/16/17 00:37)


 











Past Medical History





- General


Information source: Patient





- Social History


Smoking Status: Current Every Day Smoker


Chew tobacco use (# tins/day): No


Frequency of alcohol use: None


Drug Abuse: None


Family History: Reviewed & Not Pertinent


Pulmonary Medical History: 


   Denies: Hx Asthma, Hx Bronchitis, Hx Pneumonia


Renal/ Medical History: Reports: Hx Ectopic Pregnancy - treated with 

methotrexate.  Denies: Hx Peritoneal Dialysis


Musculoskeltal Medical History: Reports Hx Arthritis - Sciatica, Reports Other 

- Back pain


Psychiatric Medical History: Reports: Hx Anxiety, Hx Bipolar Disorder, Hx 

Depression


Past Surgical History: Reports: Hx Appendectomy - May 2013





- Immunizations


Hx Diphtheria, Pertussis, Tetanus Vaccination: Yes





<IONA VAUGHN - Last Filed: 10/16/17 01:44>





Review of Systems





- Review of Systems


Constitutional: No symptoms reported.  denies: Fever


EENT: No symptoms reported


Cardiovascular: No symptoms reported.  denies: Chest pain


Respiratory: No symptoms reported.  denies: Short of breath


Gastrointestinal: See HPI, Abdominal pain - RLQ, Nausea, Vomiting


Genitourinary: See HPI, Dysuria, Flank pain - right, Hematuria


Female Genitourinary: No symptoms reported


Musculoskeletal: No symptoms reported


Skin: No symptoms reported


Hematologic/Lymphatic: No symptoms reported


Neurological/Psychological: No symptoms reported


-: Yes All other systems reviewed and negative





<VAUGHNIONA - Last Filed: 10/16/17 01:44>





Physical Exam





<VAUGHN,IONA - Last Filed: 10/16/17 01:44>





<THERESE BENAVIDES - Last Filed: 10/16/17 04:21>





- Vital signs


Vitals: 


 











Temp Pulse Resp BP Pulse Ox


 


 98.7 F   116 H  20   146/85 H  98 


 


 10/16/17 00:38  10/16/17 00:38  10/16/17 00:38  10/16/17 00:38  10/16/17 00:38














- Notes


Notes: 





GENERAL: Alert, interacts well. No acute distress.


HEAD: Normocephalic, atraumatic.


EYES: Pupils equal, round, and reactive to light. Extraocular movements intact.


ENT: Oral mucosa moist, tongue midline. 


NECK: Full range of motion. Supple. Trachea midline.


LUNGS: Clear to auscultation bilaterally, no wheezes, rales, or rhonchi. No 

respiratory distress.


HEART: Regular rate and rhythm. No murmurs, gallops, or rubs.


ABDOMEN: Soft. Non-distended. Tenderness to palpation of the RLQ and right 

lateral side.


EXTREMITIES: Moves all 4 extremities spontaneously. No edema. No cyanosis.


BACK: Right CVA tenderness to percussion.


NEUROLOGICAL: Alert and oriented x3. Normal speech.


PSYCH: Normal affect, normal mood.


SKIN: Warm, dry, normal turgor. No rashes or lesions noted. (IONA VAUGHN)





Appears mildly uncomfortable and anxious. (THERESE BENAVIDES)





Course





<IONA VAUGHN - Last Filed: 10/16/17 01:44>





- Laboratory


Result Diagrams: 


 10/16/17 02:20





 10/16/17 02:20





<THERESE BENAVIDES - Last Filed: 10/16/17 04:21>





- Re-evaluation


Re-evalutation: 





10/16/17 03:05


CBC unremarkable, CMP grossly unremarkable, urinalysis shows large blood and 

trace leukocyte esterase, 5 WBCs greater than 182 RBCs, trace bacteria and 5 

squamous epithelial cells, pregnancy test is negative.  CT scan does not show 

any kidney stones.





I do suspect the patient just recently passed a kidney stone, given the 

bacteria and the white blood cells I will treat the patient for a urinary tract 

infection as well with Macrobid.  Recommend taking Pyridium and ibuprofen, 

discharged home.  Return for fevers or worsening pain. (THERESE BENAVIDES)





- Vital Signs


Vital signs: 


 











Temp Pulse Resp BP Pulse Ox


 


 97.8 F   102 H  20   131/51 H  97 


 


 10/16/17 03:41  10/16/17 03:41  10/16/17 03:41  10/16/17 03:41  10/16/17 03:41














- Laboratory


Laboratory results interpreted by me: 


 











  10/16/17 10/16/17 10/16/17





  00:54 02:20 02:20


 


RDW   14.2 H 


 


Chloride    109 H


 


Carbon Dioxide    21 L


 


Urine Blood  LARGE H  


 


Urine Urobilinogen  2.0 H  


 


Ur Leukocyte Esterase  TRACE H  














Discharge





<IONA VAUGHN - Last Filed: 10/16/17 01:44>





<THERESE BENAVIDES - Last Filed: 10/16/17 04:21>





- Discharge


Clinical Impression: 


Urinary tract infection


Qualifiers:


 Urinary tract infection type: acute cystitis Hematuria presence: with 

hematuria Qualified Code(s): N30.01 - Acute cystitis with hematuria





Hypertension


Qualifiers:


 Hypertension type: essential hypertension Qualified Code(s): I10 - Essential (

primary) hypertension





Condition: Stable


Disposition: HOME, SELF-CARE


Additional Instructions: 


You have signs of both blood and infection in your urine however your CAT scan 

did not show any kidney stone.  I suspect you have just recently passed a 

kidney stone.  I am prescribing antibiotics to help to treat her urinary tract 

infection.  Please take your antibiotics as directed until they are gone.  

Please also take the Pyridium to help with your pain, you may also take 

ibuprofen 800 mg every 8 hours to help with your pain.





Return for fevers, worsening pain or inability to urinate.  Please also return 

for any new or concerning symptoms.


Prescriptions: 


Ibuprofen 800 mg PO TIDP PRN #20 tablet


 PRN Reason: 


Nitrofurantoin/Nitrofuran Mac [Macrobid 100 mg Capsule] 1 tab PO BID #14 capsule


Phenazopyridine HCl [Pyridium 200 mg Tablet] 200 mg PO TID #15 tablet


Forms:  Return to Work


Scribe Attestation: 





10/16/17 04:21


I personally performed the services described in the documentation, reviewed 

and edited the documentation which was dictated to the scribe in my presence, 

and it accurately records my words and actions. (THERESE BENAVIDES)





Scribe Documentation





- Scribe


Written by Scribe:: Ralph Ha, 10/16/2017 0152


acting as scribe for :: Yinka





<IONA VAUGHN - Last Filed: 10/16/17 01:44>

## 2018-02-25 NOTE — ER DOCUMENT REPORT
ED General





- General


Chief Complaint: Abdominal Pain


Stated Complaint: ABDOMINAL PAIN


Time Seen by Provider: 02/25/18 20:47


Notes: 





Patient is a 33 year old female with a past medical history of an ectopic 

pregnancy treated medically with methotrexate as well as an appendectomy who 

presents with acute onset of right lower abdominal pain.  Patient states that 

started gradually as an aching pain to the area but is become much more severe 

since that time now with a constant, throbbing, stabbing pain to the right mid 

lower abdomen.  Nothing improves or worsens this pain.  She denies any history 

of similar symptoms in the past.  She denies any associated vomiting, diarrhea, 

vaginal bleeding, vaginal discharge, or dysuria.  She has not seen her general 

doctor regarding today's concerns.  She denies any trauma to the abdomen.


TRAVEL OUTSIDE OF THE [place] IN LAST 30 DAYS: No





- Related Data


Allergies/Adverse Reactions: 


 





codeine [Codeine] Allergy (Verified 10/16/17 00:37)


 











Past Medical History





- General


Information source: Patient





- Social History


Smoking Status: Current Every Day Smoker


Chew tobacco use (# tins/day): No


Frequency of alcohol use: None


Drug Abuse: None


Lives with: Parents


Family History: Reviewed & Not Pertinent


Patient has suicidal ideation: No


Patient has homicidal ideation: No


Pulmonary Medical History: 


   Denies: Hx Asthma, Hx Bronchitis, Hx Pneumonia


Endocrine Medical History: Reports: Hx Diabetes Mellitus Type 2


Renal/ Medical History: Reports: Hx Ectopic Pregnancy - treated with 

methotrexate.  Denies: Hx Peritoneal Dialysis


Musculoskeltal Medical History: Reports Hx Arthritis - Sciatica


Psychiatric Medical History: Reports: Hx Anxiety, Hx Attention Deficit 

Hyperactivity Disorder, Hx Bipolar Disorder, Hx Depression


Past Surgical History: Reports: Hx Appendectomy - May 2013





- Immunizations


Hx Diphtheria, Pertussis, Tetanus Vaccination: Yes





Review of Systems





- Review of Systems


Notes: 





Constitutional: Negative for fever.


HENT: Negative for sore throat.


Eyes: Negative for visual changes.


Cardiovascular: Negative for chest pain.


Respiratory: Negative for shortness of breath.


Gastrointestinal: Positive for lower abdominal pain


Genitourinary: Negative for dysuria.


Musculoskeletal: Negative for back pain.


Skin: Negative for rash.


Neurological: Negative for headaches, weakness or numbness.





10 point ROS negative except as marked above and in HPI.





Physical Exam





- Vital signs


Vitals: 


 











Resp Pulse Ox


 


 20   98 


 


 02/25/18 21:04  02/25/18 21:04











Interpretation: Normal


Notes: 





PHYSICAL EXAMINATION:





GENERAL: Appears uncomfortable but in no acute distress.





HEAD: Atraumatic, normocephalic.





EYES: Pupils equal round and reactive to light, extraocular movements intact, 

sclera anicteric, conjunctiva are normal.





ENT: nares patent, oropharynx clear without exudates.  Moderately dry mucous 

membranes.





NECK: Normal range of motion, supple without lymphadenopathy





LUNGS: Breath sounds clear to auscultation bilaterally and equal.  No wheezes 

rales or rhonchi.





HEART: Regular tachycardia without murmurs





ABDOMEN: Soft, focal tenderness the right adnexa without any other localized 

areas of tenderness, normoactive bowel sounds.  No guarding, no rebound.  No 

masses appreciated.





EXTREMITIES: Normal range of motion, no pitting or edema.  No cyanosis.





NEUROLOGICAL: No focal neurological deficits. Moves all extremities 

spontaneously and on command.





PSYCH: Normal mood, normal affect.





SKIN: Warm, Dry, normal turgor, no rashes or lesions noted.





Course





- Re-evaluation


Re-evalutation: 





02/25/18 22:38


Patient presents with relatively acute onset of right adnexal pain with focal 

reproduction of her pain on palpation of the right adnexa.  She has no other 

localized areas of abdominal tenderness rebound or guarding.  Patient is very 

status post appendectomy.  Clinical history is most concerning for possible 

ovarian torsion versus an ovarian cyst with associated rupture.  Tubo-ovarian 

abscess is also on the differential although the acuity of her presentation as 

well as the absence of fever or constitutional symptoms goes against this 

diagnosis.  Pelvic inflammatory disease also seems quite unlikely as patient is 

not having any vaginal discharge and again has very localized abdominal pain.  

Will proceed with a transvaginal ultrasound to further assess.  Alternative 

diagnostic considerations would be a localized bowel perforation although this 

seems quite unusual given the patient is not having any generalized abdominal 

tenderness, she does not have pain out of proportion to exam and has no risk 

factors for mesenteric ischemia, she has no upper abdominal pain to suggest an 

acute pancreatitis or biliary pathology, and does not have any vomiting and 

continues to pass flatus and have bowel movements which goes against the 

diagnosis of bowel obstruction.  Will proceed with pain control and await the 

results of ultrasound testing.


02/26/18 01:17


Ultrasound was unable to visualize the ovaries on either side making this a non-

useful study.  Will proceed with CT abdomen pelvis as I do need to visualize 

the right adnexa and the right lower quadrant.  Patient's vitals have overall 

improved and she is now appearing much more comfortable.


02/26/18 02:24


CT scan of the abdomen pelvis does visualize the ovaries which do not appear 

dilated.  There is no evidence of free fluid or free air on CT.  Patient's pain 

continues to be much improved.  At this time point the exact etiology of her 

lower abdominal pain is uncertain although does not appear to be from any acute 

life-threatening pathology at this point.





- Vital Signs


Vital signs: 


 











Temp Pulse Resp BP Pulse Ox


 


 97.9 F      17   131/86 H  98 


 


 02/26/18 01:47     02/25/18 22:01  02/25/18 22:01  02/25/18 22:01














- Laboratory


Result Diagrams: 


 02/25/18 21:04





 02/25/18 21:04


Laboratory results interpreted by me: 


 











  02/25/18 02/25/18 02/25/18





  21:04 21:04 22:00


 


WBC  16.0 H  


 


Absolute Neutrophils  11.3 H  


 


Carbon Dioxide   20 L 


 


Glucose   119 H 


 


Urine Protein    30 H


 


Ur Leukocyte Esterase    SMALL H














- Diagnostic Test


Radiology reviewed: Reports reviewed





Discharge





- Discharge


Clinical Impression: 


 Lower abdominal pain, Dehydration





Condition: Good


Disposition: HOME, SELF-CARE


Additional Instructions: 


You have been seen in the Emergency Department (ED) for abdominal pain.  Your 

evaluation did not identify a clear cause of your symptoms but was generally 

reassuring.





Please follow up with your doctor as soon as possible regarding today's 

emergent visit and the symptoms that are bothering you.





Return to the ED if your abdominal pain worsens or fails to improve, you 

develop bloody vomiting, bloody diarrhea, you are unable to tolerate fluids due 

to vomiting, fever greater than 101, or other symptoms that concern you.


Referrals: 


BRUNO LOVE MD [Primary Care Provider] - Follow up as needed

## 2018-02-26 NOTE — RADIOLOGY REPORT (SQ)
EXAM DESCRIPTION: U/S NON-OB PELVIS TV W/O DOP



CLINICAL HISTORY: 33 years Female, right adnexal pain



COMPARISON: None.



TECHNIQUE: Complete pelvic ultrasound with transvaginal imaging.



FINDINGS: 



The uterus measures 8.0 x 4.0 x 4.7 cm. Endometrial thickness of

0.3 cm. Cervical length of 2.4 cm. Close. No myometrial

abnormalities.



No large adnexal masses. The ovaries are not visualized

bilaterally due to overlying bowel gas.



IMPRESSION:



1. No sonographic abnormality in the pelvis identified.



2. Ovaries are not identified due to overlying bowel gas.

## 2018-02-26 NOTE — RADIOLOGY REPORT (SQ)
EXAM DESCRIPTION:  CT ABDOMEN AND PELVIS WITH CONTRAST



CLINICAL HISTORY: rlq abdominal pain, no visualized ovaries on us



COMPARISON: None Available.



TECHNIQUE: CT of the abdomen and pelvis are performed during IV

bolus administration of 100 mL of Isovue-370.



DLP: 2324.80 mGycm



FINDINGS: 



Abdomen:

The liver has normal size and decreased density. No intrahepatic

mass or biliary dilatation. No calcified gallstones.  The spleen,

pancreas, and adrenal glands are unremarkable.  The kidneys have

normal size and contour without evidence of solid mass or

hydronephrosis.  The aorta and IVC have normal caliber and

position.  The portal vein patent. The proximal visceral and

renal arteries are patent.  No free intraperitoneal air.  The

stomach and duodenum have normal course.



Pelvis: Uterus and ovaries are not enlarged. Urinary bladder is

unremarkable.  No free pelvic fluid or lymphadenopathy.  No

dilated loops of large or small bowel.  The appendix is not

definitely identified however no right lower quadrant

inflammatory change.



The visualized  lung bases are clear.



No destructive bone lesions identified.





IMPRESSION: 

1. No acute inflammatory or obstructive abnormality identified.



This exam was performed according to our departmental

dose-optimization program, which includes automated exposure

control, adjustment of the mA and/or kV according to patient size

and/or use of iterative reconstruction technique.

## 2018-02-27 NOTE — ER DOCUMENT REPORT
ED GI/





- General


Chief Complaint: Abdominal Pain


Stated Complaint: ABDOMINAL PAIN,NAUSEA


Time Seen by Provider: 02/27/18 20:05


Notes: 


Patient is a 33-year-old female comes emergency department for chief complaint 

of sharp right mid to lower abdominal pain that radiates around to her lower 

back occasionally, she states that she was seen here in the emergency 

department 2 days ago, states her workup did not show anything abnormal, states 

she was doing well but today pain started worsening, she became nauseated and 

then tonight pain became severe.  She states she had a normal bowel movement 

within the past 24 hours, nonbloody, she reports some clearish vaginal discharge

, denies vaginal bleeding, she denies dysuria, fever or chills.  She has had an 

appendectomy, she had an ectopic pregnancy with this was treated with 

medication and not surgically.  Past medical history of type 2 diabetes, is 

also on Klonopin, amphetamine, Zyprexa, Zoloft.   at bedside.





TRAVEL OUTSIDE OF THE [place] IN LAST 30 DAYS: No





- Related Data


Allergies/Adverse Reactions: 


 





codeine [Codeine] Allergy (Verified 10/16/17 00:37)


 











Past Medical History





- General


Information source: Patient, Relative





- Social History


Smoking Status: Never Smoker


Frequency of alcohol use: Occasional


Drug Abuse: Marijuana


Lives with: Family


Family History: Reviewed & Not Pertinent


Pulmonary Medical History: 


   Denies: Hx Asthma, Hx Bronchitis, Hx Pneumonia


Endocrine Medical History: Reports: Hx Diabetes Mellitus Type 2


Renal/ Medical History: Reports: Hx Ectopic Pregnancy - treated with 

methotrexate.  Denies: Hx Peritoneal Dialysis


Musculoskeltal Medical History: Reports Hx Arthritis - Sciatica


Psychiatric Medical History: Reports: Hx Anxiety, Hx Attention Deficit 

Hyperactivity Disorder, Hx Bipolar Disorder, Hx Depression


Past Surgical History: Reports: Hx Appendectomy - May 2013





- Immunizations


Hx Diphtheria, Pertussis, Tetanus Vaccination: Yes





Review of Systems





- Review of Systems


Constitutional: No symptoms reported


EENT: No symptoms reported


Cardiovascular: No symptoms reported


Respiratory: No symptoms reported


Gastrointestinal: See HPI


Genitourinary: See HPI


Female Genitourinary: See HPI


Musculoskeletal: No symptoms reported


Skin: No symptoms reported


Hematologic/Lymphatic: No symptoms reported


Neurological/Psychological: No symptoms reported





Physical Exam





- Vital signs


Vitals: 


 











Temp Pulse Resp BP Pulse Ox


 


 97.5 F   129 H  18   112/71   98 


 


 02/27/18 19:41  02/27/18 19:41  02/27/18 19:41  02/27/18 19:41  02/27/18 19:41











Interpretation: Normal





- General


General appearance: Anxious


In distress: Mild - Patient does appear to be uncomfortable, shifting a lot in 

the bed





- HEENT


Head: Normocephalic, Atraumatic


Eyes: Normal


Pupils: PERRL





- Respiratory


Respiratory status: No respiratory distress


Chest status: Nontender


Breath sounds: Normal


Chest palpation: Normal





- Cardiovascular


Rhythm: Regular, Tachycardia


Heart sounds: Normal auscultation, S1 appreciated, S2 appreciated


Murmur: No


Normal capillary refill: Yes





- Abdominal


Inspection: Normal


Distension: No distension


Bowel sounds: Normal


Tenderness: Tender - Tender in both lower abdominal/pelvic quadrants, no focal 

tenderness or guarding, no rigidity, no rebound tenderness.  No: McBurney's 

point, Valencia's sign, Guarding


Organomegaly: No organomegaly





- Genitourinary


External exam: Normal


Speculum exam: Cervix closed, Vaginal discharge - Moderate amount of vaginal 

discharge, clear and white


Vaginal bleeding: None


Bimanuel exam: No: Cervical motion tender





- Back


Back: Normal, Nontender.  No: Tender, CVA tenderness





- Extremities


General upper extremity: Normal inspection, Nontender, Normal color, Normal ROM

, Normal temperature


General lower extremity: Normal inspection, Nontender, Normal color, Normal ROM

, Normal temperature, Normal weight bearing.  No: Abram's sign





- Neurological


Neuro grossly intact: Yes


Cognition: Normal


Orientation: AAOx4


Ronceverte Coma Scale Eye Opening: Spontaneous


Ronceverte Coma Scale Verbal: Oriented


Nan Coma Scale Motor: Obeys Commands


Nan Coma Scale Total: 15


Speech: Normal


Motor strength normal: LUE, RUE, LLE, RLE


Sensory: Normal





- Psychological


Associated symptoms: Normal affect, Normal mood





- Skin


Skin Temperature: Warm


Skin Moisture: Dry


Skin Color: Normal





Course





- Re-evaluation


Re-evalutation: 


Patient does have leukocytosis although this is slightly decreased from prior.  

No fever, tachycardic on initial examination but patient also appears anxious 

and she has dry mucous membranes.  Given IV fluids, pain medication, nausea 

medication.  Patient with generalized lower abdominal tenderness without any 

focal tenderness or guarding.  Upper abdomen is benign.





Pelvic examination shows vaginal discharge, no obvious cervical motion 

tenderness, 3+ bacteria and 2+ white blood cells, negative gonorrhea, chlamydia

, trichomonas.  Urine shows dehydration but is otherwise unremarkable.  

Chemistry generally unremarkable.  Patient already had a CAT scan within the 

past 48 hours, already had an ultrasound that did not show any cysts, patient 

has already had an appendectomy.  Patient's tachycardia resolved.  I discussed 

different possibilities, appears to be either pelvic or bowel in nature, after 

discussion decision was made to treat patient for suspected PID, refer patient 

to gastroenterology, and also discussed return precautions.  Patient and 

significant other state understanding and agreement.








- Vital Signs


Vital signs: 


 











Temp Pulse Resp BP Pulse Ox


 


 98.1 F   129 H  14   118/69   97 


 


 02/28/18 01:28  02/27/18 19:41  02/28/18 01:13  02/28/18 01:13  02/28/18 01:12














- Laboratory


Result Diagrams: 


 02/27/18 20:24





 02/27/18 20:24


Laboratory results interpreted by me: 


 











  02/27/18 02/27/18 02/27/18





  20:24 20:24 22:00


 


WBC  14.3 H  


 


Absolute Neutrophils  10.5 H  


 


Carbon Dioxide   21 L 


 


Glucose   111 H 


 


Urine Urobilinogen    2.0 H














Discharge





- Discharge


Clinical Impression: 


 Lower abdominal pain, Dehydration, Vaginal discharge





Condition: Stable


Disposition: HOME, SELF-CARE


Additional Instructions: 


You have been treated for a pelvic infection.  Finished treatment by taking 

Flagyl as prescribed to completion.


Take Phenergan if needed for nausea, take pain medication only if needed, I 

recommend you follow-up with gastroenterology as well for additional evaluation 

and management.  See referral to call.





Return if you worsen including vomiting, worsening pain, fever of 100.4 or 

greater, or any other concerning or worsening symptoms.


Prescriptions: 


Morphine Sulfate [Morphine Ir 15 Mg Tablet] 15 mg PO Q4HP PRN #10 tablet


 PRN Reason: 


Metronidazole [Flagyl 500 mg Tablet] 500 mg PO BID #14 tablet


Promethazine HCl [Phenergan 25 mg Tablet] 1 - 2 tab PO Q6H PRN #20 tablet


 PRN Reason: 


Referrals: 


MAYELA WOOD MD [ACTIVE STAFF] - Follow up as needed


MANJIT CHRISTY MD [ACTIVE STAFF] - Follow up as needed

## 2018-07-12 NOTE — ER DOCUMENT REPORT
ED General





- General


Chief Complaint: Pelvic Pain


Stated Complaint: PELVIC AND BACK PAIN


Time Seen by Provider: 07/12/18 00:30


Mode of Arrival: Ambulatory


Notes: 


Patient is a 33-year-old female presents with complaint of pelvic pain rating 

to her back.  This was in the right side.  Should the exact same symptoms 1-2 

months ago and at that time had actual vaginosis and was treated and improved.  

Patient is sexually monogamous with her .  She denies any concerns 6 

transmitted diseases.  She has noticed little bit of discharge recently.  No 

fevers.  No vomiting.  She does have previous history of ectopic pregnancy.  

She does not think she be pregnant at this time.  She has had an appendectomy 

in the past.





TRAVEL OUTSIDE OF THE [place] IN LAST 30 DAYS: No





- Related Data


Allergies/Adverse Reactions: 


 





codeine [Codeine] Allergy (Verified 10/16/17 00:37)


 











Past Medical History





- General


Information source: Patient





- Social History


Smoking Status: Current Every Day Smoker


Chew tobacco use (# tins/day): No


Frequency of alcohol use: None


Drug Abuse: None


Family History: Reviewed & Not Pertinent


Patient has suicidal ideation: No


Patient has homicidal ideation: No


Pulmonary Medical History: 


   Denies: Hx Asthma, Hx Bronchitis, Hx Pneumonia


Endocrine Medical History: Reports: Hx Diabetes Mellitus Type 2


Renal/ Medical History: Reports: Hx Ectopic Pregnancy - treated with 

methotrexate.  Denies: Hx Peritoneal Dialysis


Musculoskeltal Medical History: Reports Hx Arthritis - Sciatica


Psychiatric Medical History: Reports: Hx Anxiety, Hx Attention Deficit 

Hyperactivity Disorder, Hx Bipolar Disorder, Hx Depression


Past Surgical History: Reports: Hx Appendectomy - May 2013





- Immunizations


Hx Diphtheria, Pertussis, Tetanus Vaccination: Yes





Review of Systems





- Review of Systems


Notes: 





My Normal Review Basic





REVIEW OF SYSTEMS:


CONSTITUTIONAL :  Denies fever,  chills, or sweats.  Denies recent illness.


RESPIRATORY:  Denies cough, cold, or chest congestion.  Denies shortness of 

breath, difficulty breathing, or wheezing.


GASTROINTESTINAL: Suprapubic and right lower pain.  Denies nausea, vomiting, or 

diarrhea. 


GENITOURINARY:  Denies difficulty urinating, painful urination, burning, 

frequency, or blood in urine.


FEMALE  GENITOURINARY: Abnormal vaginal discharge.  Pelvic pain.


MUSCULOSKELETAL:  Denies neck or back pain or joint pain or swelling.


SKIN:   Denies rash or skin lesions.


NEUROLOGICAL:  Denies altered mental status or loss of consciousness.  Denies 

headache.  Denies weakness or paralysis or loss of use of either side.  Denies 

problems with gait or speech.  Denies sensory or motor loss.


ALL OTHER SYSTEMS REVIEWED AND NEGATIVE.





Physical Exam





- Vital signs


Vitals: 


 











Temp Pulse Resp BP Pulse Ox


 


 97.7 F   112 H  20   123/94 H  97 


 


 07/11/18 23:41  07/11/18 23:41  07/11/18 23:41  07/11/18 23:41  07/11/18 23:41














- Notes


Notes: 





General Appearance: Well nourished, alert, cooperative, no acute distress, 

moderate obvious discomfort.


Vitals: reviewed, See vital signs table.


Head: no swelling or tenderness to the head


Eyes: PERRL, EOMI, Conjuctiva clear


Mouth: No decreasd moisture


Lungs: No wheezing, No rales, No rhonci, No accessory muscle use, good air 

exchange bilaterally.


Heart: Normal rate, Regular rythm, No murmur, no rub


Abdomen: Normal BS, soft, No rigidity, No abdominal tenderness, No guarding, no 

rebound, no abdominal masses, no organomegaly


Exam: Normal external genitalia.  No blood in vaginal vault.  On pelvic exam 

there is a small consistent with that of bacterial vaginosis.  Small amount of 

whitish discharge.  Pelvic exam performed with female ED tech Ayse Torres at 

bedside.


Extremities: strength 5/5 in all extremities, good pulses in all extremities, 

no swelling or tenderness in the extremities, no edema.


Skin: warm, dry, appropriate color, no rash


Neuro: speech clear, oriented x 3, normal affect, responds appropriately to 

questions.





Course





- Re-evaluation


Re-evalutation: 





07/12/18 04:08


Patient is feeling much improved after receiving pain medicine.  She is very 

comfortable this time.  Her wet prep suggests potential vaginosis.  Pelvic exam 

shows smell suggestive of bacterial vaginosis as well.  Her gonorrhea and 

chlamydia swabs are pending however I do not suspect these will be positive 

this patient is sexually monogamous with her  last time she had the 

symptoms her gonorrhea and chlamydia swabs were negative at that time as well.  

Patient will be given prescription for antibiotics.  She is encouraged to 

return to ER if she has worsening pain, fevers, or feels unwell.  Patient 

agrees with plan will be discharged home.





Dictation of this chart was performed using voice recognition software; 

therefore, there may be some unintended grammatical errors.





- Vital Signs


Vital signs: 


 











Temp Pulse Resp BP Pulse Ox


 


 97.4 F   86   16   127/84 H  98 


 


 07/12/18 04:11  07/12/18 04:11  07/12/18 04:11  07/12/18 04:11  07/12/18 04:11














- Laboratory


Laboratory results interpreted by me: 


 











  07/12/18





  01:25


 


Urine Protein  30 H


 


Urine Ketones  TRACE H


 


Urine Bilirubin  SMALL H


 


Urine Urobilinogen  4.0 H














Discharge





- Discharge


Clinical Impression: 


 Bacterial vaginosis





Condition: Good


Disposition: HOME, SELF-CARE


Additional Instructions: 


I suspect that your pain is related to bacterial vaginosis.  Pelvic exam and 

the swab that we did during the pelvic exam suggests that this most likely is 

bacterial vaginosis.  We will place you on antibiotic.  Please return to ER 

immediately if you have fevers, worsening pain, or if you feel unwell.  Please 

follow-up with your gynecologist in 1 week due to recurrence of this infection.

  Do not take NSAID medicaitons such as Aspirin, Motrin, Ibuprofen, Aleve, or 

Advil when taking the Toradol. It is okay to take Tylenol.


Prescriptions: 


Ketorolac Tromethamine [Toradol 10 mg Tablet] 10 mg PO Q8HP PRN #12 tablet


 PRN Reason: For Breakthrough Pain


Metronidazole [Flagyl 500 mg Tablet] 500 mg PO BID #14 tablet

## 2018-07-12 NOTE — ER DOCUMENT REPORT
ED Medical Screen (RME)





- General


Chief Complaint: Pelvic Pain


Stated Complaint: PELVIC AND BACK PAIN


Time Seen by Provider: 07/12/18 00:30


Mode of Arrival: Ambulatory


Information source: Patient


Notes: 





33-year-old female presents to ED for complaint of pain in her pelvic area.  

She states she was here recently for the same pain and was told that she had a 

pelvic infection but was not told what kind of infection.  She states she has a 

yellow discharge that does not smell foul.  She states she has had some nausea 

and vomiting and dizziness and sometimes when the pain gets very bad she feels 

like she might pass out.  She denies any urinary symptoms.  Her last visit here 

was in September and there was no pelvic infection at that time she was 

positive for multiple drugs








I have greeted and performed a rapid initial assessment of this patient.  A 

comprehensive ED assessment and evaluation of the patient, analysis of test 

results and completion of medical decision making process will be conducted by 

an additional ED providers.


TRAVEL OUTSIDE OF THE [place] IN LAST 30 DAYS: No





- Related Data


Allergies/Adverse Reactions: 


 





codeine [Codeine] Allergy (Verified 10/16/17 00:37)


 











Past Medical History


Pulmonary Medical History: 


   Denies: Hx Asthma, Hx Bronchitis, Hx Pneumonia


Endocrine Medical History: Reports: Hx Diabetes Mellitus Type 2


Renal/ Medical History: Reports: Hx Ectopic Pregnancy - treated with 

methotrexate.  Denies: Hx Peritoneal Dialysis


Musculoskeltal Medical History: Reports Hx Arthritis - Sciatica


Psychiatric Medical History: Reports: Hx Anxiety, Hx Attention Deficit 

Hyperactivity Disorder, Hx Bipolar Disorder, Hx Depression


Past Surgical History: Reports: Hx Appendectomy - May 2013





- Immunizations


Hx Diphtheria, Pertussis, Tetanus Vaccination: Yes





Physical Exam





- Vital signs


Vitals: 


 











Temp Pulse Resp BP Pulse Ox


 


 97.7 F   112 H  20   123/94 H  97 


 


 07/11/18 23:41  07/11/18 23:41  07/11/18 23:41  07/11/18 23:41  07/11/18 23:41














Course





- Vital Signs


Vital signs: 


 











Temp Pulse Resp BP Pulse Ox


 


 97.7 F   112 H  20   123/94 H  97 


 


 07/11/18 23:41  07/11/18 23:41  07/11/18 23:41  07/11/18 23:41  07/11/18 23:41

## 2019-03-12 NOTE — RADIOLOGY REPORT (SQ)
EXAM DESCRIPTION: 



XR CHEST 2 VIEWS



COMPLETED DATE/TME:  03/12/2019 01:51



CLINICAL HISTORY: 



34 years, Female, cough



COMPARISON:

X-ray chest 3/6/2017



NUMBER OF VIEWS:





TECHNIQUE:





LIMITATIONS:

None.



FINDINGS:



No evidence of pulmonary infiltrate or pleural effusion.



The heart and mediastinum are unremarkable.



Pulmonary vascularity appears normal.



IMPRESSION:



No acute finding.

 



copyright 2011 [employer]- All Rights Reserved

## 2019-03-12 NOTE — ER DOCUMENT REPORT
ED General





- General


Chief Complaint: Fever


Stated Complaint: UPPER RESPIRATORY SYMPTOMS,FEVER


Time Seen by Provider: 03/12/19 01:40


Primary Care Provider: 


BRUNO LOVE MD [Primary Care Provider] - Follow up as needed


TRAVEL OUTSIDE OF THE [place] IN LAST 30 DAYS: No





- HPI


Notes: 





Patient presents emergency department for evaluation of cough and intermittent 

fevers.  Is been going on for the last 2 weeks.  She denies any nausea or 

vomiting.  She is eating and drinking well.  She states she has been exposed to 

"insulation" on her job, is worried that this might be responsible for her 

symptoms.  She states she was on antibiotics from her primary care physician 

without any significant relief.





- Related Data


Allergies/Adverse Reactions: 


                                        





codeine [Codeine] Allergy (Verified 10/16/17 00:37)


   











Past Medical History





- General


Information source: Patient





- Social History


Smoking Status: Unknown if Ever Smoked


Family History: Reviewed & Not Pertinent


Patient has suicidal ideation: No


Patient has homicidal ideation: No


Pulmonary Medical History: 


   Denies: Hx Asthma, Hx Bronchitis, Hx Pneumonia


Endocrine Medical History: Reports: Hx Diabetes Mellitus Type 2


Renal/ Medical History: Reports: Hx Ectopic Pregnancy - treated with 

methotrexate.  Denies: Hx Peritoneal Dialysis


Musculoskeletal Medical History: Reports Hx Arthritis - Sciatica


Psychiatric Medical History: Reports: Hx Anxiety, Hx Attention Deficit 

Hyperactivity Disorder, Hx Bipolar Disorder, Hx Depression


Past Surgical History: Reports: Hx Appendectomy - May 2013





- Immunizations


Hx Diphtheria, Pertussis, Tetanus Vaccination: Yes





Review of Systems





- Review of Systems


Constitutional: See HPI


EENT: No symptoms reported


Cardiovascular: No symptoms reported


Respiratory: See HPI


Gastrointestinal: No symptoms reported


Female Genitourinary: No symptoms reported


Musculoskeletal: No symptoms reported


Skin: No symptoms reported


Neurological/Psychological: No symptoms reported





Physical Exam





- Vital signs


Vitals: 


                                        











Temp Pulse Resp BP Pulse Ox


 


 98.4 F   124 H  22 H  119/69   97 


 


 03/11/19 22:20  03/11/19 22:20  03/11/19 22:20  03/11/19 22:20  03/11/19 22:20














- Notes


Notes: 





Vital signs reviewed, please refer to chart.  Patient with hacking, dry cough 

occasionally.  Patient is normocephalic, atraumatic.  Pupils equal round, 

reactive to light.  Neck is supple without meningismus.  Heart is regular, mild 

tachycardia.  Lungs are clear to auscultation bilaterally.  Abdomen is soft, 

nontender, normoactive bowel sounds throughout.  Extremities without cyanosis, 

clubbing, edema.  Peripheral pulses are equal.  Skin is warm and dry.  Patient 

is awake, alert, neurological exam is nonfocal.





Course





- Re-evaluation


Re-evalutation: 





03/12/19 06:53


Patient presents to the emergency department for evaluation.  Laboratory 

investigations are largely unremarkable.  She was given IV fluids.  Influenza 

was negative.  She was feeling improved after steroids here.  I suspect it may 

be she has a mild pneumonitis.  She is allergic to codeine and cannot take 

dextromethorphan.  We will send her home with Hycodan and prednisone.  She is to

 follow-up with primary care, return to the ED with worsening or new concerning 

symptoms.





- Vital Signs


Vital signs: 


                                        











Temp Pulse Resp BP Pulse Ox


 


 98.6 F   106 H  22 H  127/71 H  98 


 


 03/12/19 04:22  03/12/19 04:39  03/12/19 04:22  03/12/19 04:22  03/12/19 04:22














- Laboratory


Result Diagrams: 


                                 03/12/19 02:00





                                 03/12/19 02:00


Laboratory results interpreted by me: 


                                        











  03/12/19 03/12/19





  02:00 02:00


 


WBC  11.1 H 


 


Seg Neutrophils %  81.6 H 


 


Lymphocytes %  9.6 L 


 


Absolute Neutrophils  9.1 H 


 


Glucose   115 H














- Diagnostic Test


Radiology reviewed: Reports reviewed - No acute cardiopulmonary disease





Discharge





- Discharge


Clinical Impression: 


 Cough





Condition: Stable


Disposition: HOME, SELF-CARE


Additional Instructions: 


Take medications as prescribed.  Follow-up with your primary care physician in 

1-2 weeks.  Return to the emergency department with worsening or new concerning 

terms.


Prescriptions: 


Hydrocodone Bit/Homatropine [Hycodan Syrup 5-1.5 mg/5 ml Ud Cup] 5 ml PO Q4HP 

PRN #80 ml


 PRN Reason: 


Prednisone [Deltasone 20 mg Tablet] 3 tab PO DAILY 5 Days #15 tablet


Forms:  Return to Work


Referrals: 


BRUNO LOVE MD [Primary Care Provider] - Follow up as needed

## 2019-07-28 ENCOUNTER — HOSPITAL ENCOUNTER (EMERGENCY)
Dept: HOSPITAL 62 - ER | Age: 34
Discharge: HOME | End: 2019-07-28
Payer: SELF-PAY

## 2019-07-28 VITALS — SYSTOLIC BLOOD PRESSURE: 118 MMHG | DIASTOLIC BLOOD PRESSURE: 72 MMHG

## 2019-07-28 DIAGNOSIS — M54.41: ICD-10-CM

## 2019-07-28 DIAGNOSIS — M54.9: ICD-10-CM

## 2019-07-28 DIAGNOSIS — E11.9: ICD-10-CM

## 2019-07-28 DIAGNOSIS — F17.200: ICD-10-CM

## 2019-07-28 DIAGNOSIS — S30.0XXA: Primary | ICD-10-CM

## 2019-07-28 DIAGNOSIS — K59.00: ICD-10-CM
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
## 2019-07-28 LAB
ADD MANUAL DIFF: NO
ALBUMIN SERPL-MCNC: 4.1 G/DL (ref 3.5–5)
ALP SERPL-CCNC: 97 U/L (ref 38–126)
ALT SERPL-CCNC: 27 U/L (ref 9–52)
ANION GAP SERPL CALC-SCNC: 12 MMOL/L (ref 5–19)
APPEARANCE UR: CLEAR
APTT PPP: YELLOW S
AST SERPL-CCNC: 25 U/L (ref 14–36)
BASOPHILS # BLD AUTO: 0.1 10^3/UL (ref 0–0.2)
BASOPHILS NFR BLD AUTO: 0.5 % (ref 0–2)
BILIRUB DIRECT SERPL-MCNC: 0.3 MG/DL (ref 0–0.4)
BILIRUB SERPL-MCNC: 0.5 MG/DL (ref 0.2–1.3)
BILIRUB UR QL STRIP: NEGATIVE
BUN SERPL-MCNC: 14 MG/DL (ref 7–20)
CALCIUM: 9.5 MG/DL (ref 8.4–10.2)
CHLORIDE SERPL-SCNC: 104 MMOL/L (ref 98–107)
CO2 SERPL-SCNC: 21 MMOL/L (ref 22–30)
EOSINOPHIL # BLD AUTO: 0.4 10^3/UL (ref 0–0.6)
EOSINOPHIL NFR BLD AUTO: 3.2 % (ref 0–6)
ERYTHROCYTE [DISTWIDTH] IN BLOOD BY AUTOMATED COUNT: 14.1 % (ref 11.5–14)
GLUCOSE SERPL-MCNC: 186 MG/DL (ref 75–110)
GLUCOSE UR STRIP-MCNC: NEGATIVE MG/DL
HCT VFR BLD CALC: 38.3 % (ref 36–47)
HGB BLD-MCNC: 13.1 G/DL (ref 12–15.5)
KETONES UR STRIP-MCNC: NEGATIVE MG/DL
LYMPHOCYTES # BLD AUTO: 2.5 10^3/UL (ref 0.5–4.7)
LYMPHOCYTES NFR BLD AUTO: 17.8 % (ref 13–45)
MCH RBC QN AUTO: 29.4 PG (ref 27–33.4)
MCHC RBC AUTO-ENTMCNC: 34.1 G/DL (ref 32–36)
MCV RBC AUTO: 86 FL (ref 80–97)
MONOCYTES # BLD AUTO: 0.6 10^3/UL (ref 0.1–1.4)
MONOCYTES NFR BLD AUTO: 4.3 % (ref 3–13)
NEUTROPHILS # BLD AUTO: 10.4 10^3/UL (ref 1.7–8.2)
NEUTS SEG NFR BLD AUTO: 74.2 % (ref 42–78)
NITRITE UR QL STRIP: NEGATIVE
PH UR STRIP: 5 [PH] (ref 5–9)
PLATELET # BLD: 354 10^3/UL (ref 150–450)
POTASSIUM SERPL-SCNC: 4.3 MMOL/L (ref 3.6–5)
PROT SERPL-MCNC: 7 G/DL (ref 6.3–8.2)
PROT UR STRIP-MCNC: NEGATIVE MG/DL
RBC # BLD AUTO: 4.44 10^6/UL (ref 3.72–5.28)
SP GR UR STRIP: 1.01
TOTAL CELLS COUNTED % (AUTO): 100 %
UROBILINOGEN UR-MCNC: NEGATIVE MG/DL (ref ?–2)
WBC # BLD AUTO: 14 10^3/UL (ref 4–10.5)

## 2019-07-28 PROCEDURE — 96374 THER/PROPH/DIAG INJ IV PUSH: CPT

## 2019-07-28 PROCEDURE — 36415 COLL VENOUS BLD VENIPUNCTURE: CPT

## 2019-07-28 PROCEDURE — 72148 MRI LUMBAR SPINE W/O DYE: CPT

## 2019-07-28 PROCEDURE — 99284 EMERGENCY DEPT VISIT MOD MDM: CPT

## 2019-07-28 PROCEDURE — 81001 URINALYSIS AUTO W/SCOPE: CPT

## 2019-07-28 PROCEDURE — 80053 COMPREHEN METABOLIC PANEL: CPT

## 2019-07-28 PROCEDURE — 96375 TX/PRO/DX INJ NEW DRUG ADDON: CPT

## 2019-07-28 PROCEDURE — 85025 COMPLETE CBC W/AUTO DIFF WBC: CPT

## 2019-07-28 PROCEDURE — 96376 TX/PRO/DX INJ SAME DRUG ADON: CPT

## 2019-07-28 NOTE — RADIOLOGY REPORT (SQ)
EXAM DESCRIPTION:  MRI LUMBAR SPINE WITHOUT



COMPLETED DATE/TIME:  7/28/2019 3:23 pm



REASON FOR STUDY:  back pain, injury, inability to move bowels



COMPARISON:  None.



TECHNIQUE:  Sagittal and Axial imaging includes T1, T2, STIR and gradient echo sequences. Coronal T2/
HASTE imaging.



LIMITATIONS:  None.



FINDINGS:  VISUALIZED UPPER ABDOMEN:  Limited evaluation. No acute or suspicious findings suggested.

SEGMENTATION: No transitional anatomy. The lowest well-developed disc space is labeled L5-S1.

ALIGNMENT: Anatomic.

VERTEBRAE: Intact.

BONE MARROW: Normal. No marrow replacement or reactive changes.

DISC SIGNAL: Generally preserved.

POSTERIOR ELEMENTS:  Generally intact.  No pars defect evident.

HARDWARE: None in the spine.

CORD AND CONUS: Normal in size and signal intensity. Conus at the appropriate level.

SOFT TISSUES: No aortic aneurysm seen. No bulky retroperitoneal adenopathy or mass. No paraspinal mas
s or fluid.

L1-L2: No significant spinal stenosis or exit foraminal stenosis.

L2-L3: No significant spinal stenosis or exit foraminal stenosis.

L3-L4: No significant spinal stenosis or exit foraminal stenosis.

L4-L5: Minimal facet arthropathy without significant overgrowth or stenosis.

L5-S1: Mild central to left paracentral protrusion.  No suggestion of significant mass effect on the 
S1 nerve roots.  Suspect mild -moderate bilateral foraminal narrowing.

LOWER THORACIC: Incompletely imaged.  No stenosis seen.

SACRUM: Visualized upper sacrum intact.

OTHER: No other significant findings.



IMPRESSION:

1. Lumbar spondylosis.  Most notable at L5-S1.  This results in noncritical foraminal stenosis.

2. No evidence of fracture or spinal malalignment.



TECHNICAL DOCUMENTATION:  JOB ID:  [id number]

 2011 [employer]- All Rights Reserved



Reading location - IP/workstation name: [email or web address]

## 2019-07-28 NOTE — ER DOCUMENT REPORT
ED General





- General


Chief Complaint: Back Pain


Stated Complaint: BACK PAIN


Time Seen by Provider: 07/28/19 11:30


TRAVEL OUTSIDE OF THE [place] IN LAST 30 DAYS: No





- HPI


Notes: 





Patient is a 34-year-old female who presents to the emergency department for 

evaluation.  2 days ago she fell down a few steps.  She landed on her buttocks, 

slid down a few stairs on her buttocks.  She denies hitting her head or losing 

consciousness.  She states really at that point she had only minimal pain.  

Since then she has had progressive pain in her lower back.  It radiates down her

entire right leg, and she has associated numbness on her anterior thighs.  She 

states she is having difficulty moving her bowels.  She states normally she 

moves her bowels every single morning, she has not moved her bowels since the 

fall.  She also feels like she is urinating more frequently.  She denies any 

saddle anesthesia no focal weakness that she is appreciated.  She has had no 

incontinence.





- Related Data


Allergies/Adverse Reactions: 


                                        





codeine [Codeine] Allergy (Verified 07/28/19 10:24)


   











Past Medical History





- General


Information source: Patient





- Social History


Smoking Status: Current Every Day Smoker


Frequency of alcohol use: None


Drug Abuse: Marijuana


Family History: Reviewed & Not Pertinent


Patient has suicidal ideation: No


Patient has homicidal ideation: No


Pulmonary Medical History: 


   Denies: Hx Asthma, Hx Bronchitis, Hx Pneumonia


Endocrine Medical History: Reports: Hx Diabetes Mellitus Type 2


Renal/ Medical History: Reports: Hx Ectopic Pregnancy - treated with 

methotrexate.  Denies: Hx Peritoneal Dialysis


Musculoskeletal Medical History: Reports Hx Arthritis - Sciatica


Psychiatric Medical History: Reports: Hx Anxiety, Hx Attention Deficit 

Hyperactivity Disorder, Hx Bipolar Disorder, Hx Depression


Past Surgical History: Reports: Hx Appendectomy - May 2013





- Immunizations


Hx Diphtheria, Pertussis, Tetanus Vaccination: Yes





Review of Systems





- Review of Systems


Constitutional: No symptoms reported


EENT: No symptoms reported


Cardiovascular: No symptoms reported


Respiratory: No symptoms reported


Gastrointestinal: See HPI


Genitourinary: See HPI


Female Genitourinary: No symptoms reported


Musculoskeletal: No symptoms reported


Skin: No symptoms reported


Neurological/Psychological: See HPI





Physical Exam





- Vital signs


Vitals: 


                                        











Temp Pulse Resp BP Pulse Ox


 


 97.9 F   90   16   145/82 H  95 


 


 07/28/19 10:56  07/28/19 10:56  07/28/19 10:56  07/28/19 10:56  07/28/19 10:56














- Notes


Notes: 





Vital signs reviewed, please refer to chart. Head is normocephalic, atraumatic. 

 Pupils equal round, reactive to light.  Neck is supple without meningismus.  

Heart is regular rate and rhythm.  Lungs are clear to auscultation bilaterally. 

 Abdomen is soft, nontender, normoactive bowel sounds throughout.  Extremities 

without cyanosis, clubbing. Posterior calves are nontender.  Peripheral pulses 

are equal.  Skin is warm and dry.  Patient is awake, alert, neurological exam is

 nonfocal.  Examination of the spine yields no midline tenderness or step-off.  

She has paraspinal musculature tenderness throughout the lumbar spine, right 

greater than left.  She has a positive straight leg raise on the right, negative

 on the left.  Patella and Achilles reflexes are 1+ bilaterally.  Sensation is 

intact.  Strength is plus 5 out of 5 bilateral lower extremities.  Rectal exam 

is performed.  She has no saddle anesthesia.  Normal rectal sensation and good 

tone.





Course





- Re-evaluation


Re-evalutation: 





07/28/19 14:25


Patient presents to the emergency department for evaluation.  Because of her 

difficulty moving her bowels, there is some concern about the possibility of 

cauda equina syndrome.  MRI was ordered.  Laboratory investigations showed a 

very mild leukocytosis, elevated glucose, but no other real acute concerns.  

Patient was medicated with some improvement.  MRI is still pending at this time.

  I do not have a strong suspicion for cauda equina in this patient who has 

radicular symptoms, but no significant changes in rectal tone or saddle 

paresthesias.  I do, however, suspect she does have some aspect of lumbar 

radiculopathy, particularly given her diminished reflexes and her positive 

straight leg raise test on the right.  I will write her a prescription for 

anti-inflammatories, muscle relaxers, mild pain medication.  I will avoid 

steroids in this patient who is a diabetic.  MRI still pending at this time.  If

 any significant abnormality that requires urgent intervention is identified, 

this will be dealt with by another emergency physician.  Otherwise if unremar

kable, patient will be discharged with close follow-up.  Patient was notified of

 plan and is agreeable.  She is to return to the ED with worsening.


07/28/19 14:36








- Vital Signs


Vital signs: 


                                        











Temp Pulse Resp BP Pulse Ox


 


 98.0 F   92   20   114/81   96 


 


 07/28/19 13:16  07/28/19 13:16  07/28/19 13:16  07/28/19 13:16  07/28/19 13:16














- Laboratory


Result Diagrams: 


                                 07/28/19 11:25





                                 07/28/19 11:25


Laboratory results interpreted by me: 


                                        











  07/28/19 07/28/19





  11:25 11:25


 


WBC  14.0 H 


 


RDW  14.1 H 


 


Absolute Neutrophils  10.4 H 


 


Sodium   136.6 L


 


Carbon Dioxide   21 L


 


Creatinine   0.40 L


 


Glucose   186 H














Discharge





- Discharge


Clinical Impression: 


Lumbar contusion


Qualifiers:


 Encounter type: initial encounter Qualified Code(s): S30.0XXA - Contusion of 

lower back and pelvis, initial encounter





Low back pain


Qualifiers:


 Chronicity: acute Back pain laterality: bilateral Sciatica presence: with 

sciatica Sciatica laterality: sciatica of right side Qualified Code(s): M54.41 -

 Lumbago with sciatica, right side





Constipation


Qualifiers:


 Constipation type: unspecified constipation type Qualified Code(s): K59.00 - 

Constipation, unspecified





Instructions:  Low Back Pain ([facility]), Muscle Strain ([facility]), Oral Narcotic 

Medication ([facility])


Additional Instructions: 


Follow-up with your primary care provider this week.  Moist heat to the lower 

back.  Take medications as directed.  Watch for constipation worsening with your

 pain medication.  If you develop worsening or new concerning symptoms of any 

sort, return immediately to the emergency department for reevaluation.


Prescriptions: 


Hydrocodone/Acetaminophen [Norco 5-325 mg Tablet] 1 tab PO Q6H PRN #8 tablet


 PRN Reason: Pain Scale Of 5


Methocarbamol [Robaxin-750] 750 mg PO TID PRN #21 tablet


 PRN Reason: 


Naproxen [Naprosyn 375 Mg Tablet] 375 mg PO BID #20 tablet

## 2019-07-28 NOTE — ER DOCUMENT REPORT
Doctor's Note


Notes: 





07/28/19 16:29


Patient signed out to me by the off going physician.  Pending MRI of the lumbar 

spine.  Instructions were that of the lumbar spine MRI did not show any obvious 

critical stenosis of the canal that the patient could be discharged home.  

Patient has been afebrile, white count is recorded.  MRI as recorded.  Patient 

has no incontinence of feces or urine.  Patient is smiling and laughing in no 

acute distress.  No swelling or erythema of the back.  Patient denies current 

weakness of the legs.  Patient will be discharged home with strict return 

precautions and follow-up with the primary doctor.